# Patient Record
Sex: MALE | Race: BLACK OR AFRICAN AMERICAN | NOT HISPANIC OR LATINO | URBAN - METROPOLITAN AREA
[De-identification: names, ages, dates, MRNs, and addresses within clinical notes are randomized per-mention and may not be internally consistent; named-entity substitution may affect disease eponyms.]

---

## 2018-12-12 ENCOUNTER — HOSPITAL ENCOUNTER (OUTPATIENT)
Dept: OCCUPATIONAL THERAPY | Facility: HOSPITAL | Age: 33
End: 2018-12-13
Attending: INTERNAL MEDICINE | Admitting: INTERNAL MEDICINE

## 2018-12-12 LAB
ABS NEUT (OLG): 6.05 X10(3)/MCL (ref 2.1–9.2)
ALBUMIN SERPL-MCNC: 3.9 GM/DL (ref 3.4–5)
ALBUMIN/GLOB SERPL: 1 {RATIO}
ALP SERPL-CCNC: 66 UNIT/L (ref 45–117)
ALT SERPL-CCNC: 15 UNIT/L (ref 16–61)
AMPHET UR QL SCN: NEGATIVE
ANISOCYTOSIS BLD QL SMEAR: ABNORMAL
APPEARANCE, UA: CLEAR
AST SERPL-CCNC: 16 UNIT/L (ref 15–37)
BARBITURATE SCN PRESENT UR: NEGATIVE
BASOPHILS # BLD AUTO: 0.05 X10(3)/MCL (ref 0–0.2)
BASOPHILS NFR BLD AUTO: 0.5 % (ref 0–0.9)
BENZODIAZ UR QL SCN: NEGATIVE
BILIRUB SERPL-MCNC: 1.4 MG/DL (ref 0.2–1)
BILIRUB UR QL STRIP: NEGATIVE
BILIRUBIN DIRECT+TOT PNL SERPL-MCNC: 0.3 MG/DL (ref 0–0.2)
BILIRUBIN DIRECT+TOT PNL SERPL-MCNC: 1.1 MG/DL (ref 0–1)
BUN SERPL-MCNC: 7 MG/DL (ref 7–18)
CALCIUM SERPL-MCNC: 8.3 MG/DL (ref 8.5–10.1)
CANNABINOIDS UR QL SCN: NEGATIVE
CHLORIDE SERPL-SCNC: 105 MMOL/L (ref 98–107)
CO2 SERPL-SCNC: 26 MMOL/L (ref 21–32)
COCAINE UR QL SCN: NEGATIVE
COLOR UR: NORMAL
CREAT SERPL-MCNC: 0.6 MG/DL (ref 0.7–1.3)
EOSINOPHIL # BLD AUTO: 0.03 X10(3)/MCL (ref 0–0.9)
EOSINOPHIL NFR BLD AUTO: 0.3 % (ref 0–6.5)
ERYTHROCYTE [DISTWIDTH] IN BLOOD BY AUTOMATED COUNT: 15.1 % (ref 11.5–17)
GLOBULIN SER-MCNC: 4 GM/DL (ref 2–4)
GLUCOSE (UA): NEGATIVE
GLUCOSE SERPL-MCNC: 97 MG/DL (ref 74–106)
HCT VFR BLD AUTO: 30.8 % (ref 42–52)
HGB BLD-MCNC: 11.6 GM/DL (ref 14–18)
HGB UR QL STRIP: NEGATIVE
IMM GRANULOCYTES # BLD AUTO: 0.02 10*3/UL (ref 0–0.02)
IMM GRANULOCYTES NFR BLD AUTO: 0.2 % (ref 0–0.43)
KETONES UR QL STRIP: NEGATIVE
LEUKOCYTE ESTERASE UR QL STRIP: NEGATIVE
LYMPHOCYTES # BLD AUTO: 2.77 X10(3)/MCL (ref 0.6–4.6)
LYMPHOCYTES NFR BLD AUTO: 29.7 % (ref 16.2–38.3)
MCH RBC QN AUTO: 30.1 PG (ref 27–31)
MCHC RBC AUTO-ENTMCNC: 37.7 GM/DL (ref 33–36)
MCV RBC AUTO: 79.8 FL (ref 80–94)
METHADONE UR QL SCN: NEGATIVE
MONOCYTES # BLD AUTO: 0.4 X10(3)/MCL (ref 0.1–1.3)
MONOCYTES NFR BLD AUTO: 4.3 % (ref 4.7–11.3)
NEUTROPHILS # BLD AUTO: 6.05 X10(3)/MCL (ref 2.1–9.2)
NEUTROPHILS NFR BLD AUTO: 65 % (ref 49.1–73.4)
NITRITE UR QL STRIP: NEGATIVE
NRBC BLD AUTO-RTO: 0.5 % (ref 0–0.2)
OPIATES UR QL SCN: ABNORMAL
PCP UR QL: NEGATIVE
PH UR STRIP.AUTO: 6.5 [PH] (ref 5–7.5)
PH UR STRIP: 6.5 [PH] (ref 5–7)
PLATELET # BLD AUTO: 441 X10(3)/MCL (ref 130–400)
PLATELET # BLD EST: ABNORMAL 10*3/UL
PMV BLD AUTO: 9 FL (ref 7.4–10.4)
POIKILOCYTOSIS BLD QL SMEAR: ABNORMAL
POTASSIUM SERPL-SCNC: 3.2 MMOL/L (ref 3.5–5.1)
PROT SERPL-MCNC: 7.8 GM/DL (ref 6.4–8.2)
PROT UR QL STRIP: NEGATIVE
RBC # BLD AUTO: 3.86 X10(6)/MCL (ref 4.7–6.1)
RBC MORPH BLD: ABNORMAL
RET# (OHS): 0.16 X10^6/ML (ref 0.03–0.1)
RETICULOCYTE COUNT AUTOMATED (OLG): 4.2 % (ref 1.1–2.1)
SODIUM SERPL-SCNC: 141 MMOL/L (ref 136–145)
SP GR UR STRIP: 1.01 (ref 1–1.03)
TARGETS BLD QL SMEAR: ABNORMAL
UROBILINOGEN UR STRIP-ACNC: NEGATIVE
WBC # SPEC AUTO: 9.3 X10(3)/MCL (ref 4.5–11.5)

## 2018-12-13 LAB
ABS NEUT (OLG): 6.94 X10(3)/MCL (ref 2.1–9.2)
BASOPHILS # BLD AUTO: 0 X10(3)/MCL (ref 0–0.2)
BASOPHILS NFR BLD AUTO: 0 %
BUN SERPL-MCNC: 5 MG/DL (ref 7–18)
CALCIUM SERPL-MCNC: 8.5 MG/DL (ref 8.5–10.1)
CHLORIDE SERPL-SCNC: 103 MMOL/L (ref 98–107)
CO2 SERPL-SCNC: 27 MMOL/L (ref 21–32)
CREAT SERPL-MCNC: 0.58 MG/DL (ref 0.7–1.3)
CREAT/UREA NIT SERPL: 8.6
EOSINOPHIL # BLD AUTO: 0.1 X10(3)/MCL (ref 0–0.9)
EOSINOPHIL NFR BLD AUTO: 1 %
ERYTHROCYTE [DISTWIDTH] IN BLOOD BY AUTOMATED COUNT: 15.1 % (ref 11.5–17)
GLUCOSE SERPL-MCNC: 122 MG/DL (ref 74–106)
HCT VFR BLD AUTO: 28.9 % (ref 42–52)
HGB BLD-MCNC: 10.7 GM/DL (ref 14–18)
LYMPHOCYTES # BLD AUTO: 3.8 X10(3)/MCL (ref 0.6–4.6)
LYMPHOCYTES NFR BLD AUTO: 32 %
MAGNESIUM SERPL-MCNC: 1.9 MG/DL (ref 1.8–2.4)
MCH RBC QN AUTO: 30.6 PG (ref 27–31)
MCHC RBC AUTO-ENTMCNC: 37 GM/DL (ref 33–36)
MCV RBC AUTO: 82.6 FL (ref 80–94)
MONOCYTES # BLD AUTO: 0.8 X10(3)/MCL (ref 0.1–1.3)
MONOCYTES NFR BLD AUTO: 7 %
NEUTROPHILS # BLD AUTO: 6.94 X10(3)/MCL (ref 2.1–9.2)
NEUTROPHILS NFR BLD AUTO: 59 %
NRBC BLD AUTO-RTO: 0.3 % (ref 0–0.2)
PLATELET # BLD AUTO: 383 X10(3)/MCL (ref 130–400)
PMV BLD AUTO: 9.5 FL (ref 9.4–12.4)
POTASSIUM SERPL-SCNC: 3.8 MMOL/L (ref 3.5–5.1)
RBC # BLD AUTO: 3.5 X10(6)/MCL (ref 4.7–6.1)
SODIUM SERPL-SCNC: 139 MMOL/L (ref 136–145)
WBC # SPEC AUTO: 11.8 X10(3)/MCL (ref 4.5–11.5)

## 2019-12-31 ENCOUNTER — HISTORICAL (OUTPATIENT)
Dept: ADMINISTRATIVE | Facility: HOSPITAL | Age: 34
End: 2019-12-31

## 2019-12-31 LAB
CHOLEST SERPL-MCNC: 170 MG/DL (ref 0–200)
CHOLEST/HDLC SERPL: 2.8 {RATIO} (ref 0–5)
HDLC SERPL-MCNC: 60 MG/DL (ref 35–60)
LDLC SERPL CALC-MCNC: 97 MG/DL (ref 0–129)
TRIGL SERPL-MCNC: 66 MG/DL (ref 30–150)
VLDLC SERPL CALC-MCNC: 13 MG/DL

## 2020-01-06 ENCOUNTER — HISTORICAL (OUTPATIENT)
Dept: ADMINISTRATIVE | Facility: HOSPITAL | Age: 35
End: 2020-01-06

## 2020-02-05 ENCOUNTER — HISTORICAL (OUTPATIENT)
Dept: INTENSIVE CARE | Facility: HOSPITAL | Age: 35
End: 2020-02-05

## 2020-07-31 ENCOUNTER — HISTORICAL (OUTPATIENT)
Dept: ADMINISTRATIVE | Facility: HOSPITAL | Age: 35
End: 2020-07-31

## 2020-07-31 LAB
ABS NEUT (OLG): 6.99 X10(3)/MCL (ref 2.1–9.2)
ALBUMIN SERPL-MCNC: 4.1 GM/DL (ref 3.5–5)
ALBUMIN/GLOB SERPL: 1.4 RATIO (ref 1.1–2)
ALP SERPL-CCNC: 82 UNIT/L (ref 40–150)
ALT SERPL-CCNC: 19 UNIT/L (ref 0–55)
AST SERPL-CCNC: 19 UNIT/L (ref 5–34)
BASOPHILS # BLD AUTO: 0 X10(3)/MCL (ref 0–0.2)
BASOPHILS NFR BLD AUTO: 0 %
BILIRUB SERPL-MCNC: 1.2 MG/DL
BILIRUBIN DIRECT+TOT PNL SERPL-MCNC: 0.4 MG/DL (ref 0–0.5)
BILIRUBIN DIRECT+TOT PNL SERPL-MCNC: 0.8 MG/DL (ref 0–0.8)
BUN SERPL-MCNC: 12.9 MG/DL (ref 8.9–20.6)
CALCIUM SERPL-MCNC: 8.9 MG/DL (ref 8.4–10.2)
CHLORIDE SERPL-SCNC: 104 MMOL/L (ref 98–107)
CHOLEST SERPL-MCNC: 192 MG/DL
CHOLEST/HDLC SERPL: 4 {RATIO} (ref 0–5)
CO2 SERPL-SCNC: 29 MMOL/L (ref 22–29)
CREAT SERPL-MCNC: 0.84 MG/DL (ref 0.73–1.18)
EOSINOPHIL # BLD AUTO: 0.1 X10(3)/MCL (ref 0–0.9)
EOSINOPHIL NFR BLD AUTO: 1 %
ERYTHROCYTE [DISTWIDTH] IN BLOOD BY AUTOMATED COUNT: 16.6 % (ref 11.5–17)
GLOBULIN SER-MCNC: 3 GM/DL (ref 2.4–3.5)
GLUCOSE SERPL-MCNC: 95 MG/DL (ref 74–100)
HCT VFR BLD AUTO: 33.8 % (ref 42–52)
HDLC SERPL-MCNC: 45 MG/DL (ref 35–60)
HGB BLD-MCNC: 11.8 GM/DL (ref 14–18)
LDLC SERPL CALC-MCNC: 129 MG/DL (ref 50–140)
LYMPHOCYTES # BLD AUTO: 1.9 X10(3)/MCL (ref 0.6–4.6)
LYMPHOCYTES NFR BLD AUTO: 19 %
MAGNESIUM SERPL-MCNC: 2.03 MG/DL (ref 1.6–2.6)
MCH RBC QN AUTO: 28.4 PG (ref 27–31)
MCHC RBC AUTO-ENTMCNC: 34.9 GM/DL (ref 33–36)
MCV RBC AUTO: 81.4 FL (ref 80–94)
MONOCYTES # BLD AUTO: 0.6 X10(3)/MCL (ref 0.1–1.3)
MONOCYTES NFR BLD AUTO: 7 %
NEUTROPHILS # BLD AUTO: 6.99 X10(3)/MCL (ref 2.1–9.2)
NEUTROPHILS NFR BLD AUTO: 72 %
PHOSPHATE SERPL-MCNC: 3.3 MG/DL (ref 2.3–4.7)
PLATELET # BLD AUTO: 314 X10(3)/MCL (ref 130–400)
PMV BLD AUTO: 9 FL (ref 9.4–12.4)
POTASSIUM SERPL-SCNC: 3.8 MMOL/L (ref 3.5–5.1)
PROT SERPL-MCNC: 7.1 GM/DL (ref 6.4–8.3)
RBC # BLD AUTO: 4.15 X10(6)/MCL (ref 4.7–6.1)
SODIUM SERPL-SCNC: 138 MMOL/L (ref 136–145)
TRIGL SERPL-MCNC: 92 MG/DL (ref 34–140)
VLDLC SERPL CALC-MCNC: 18 MG/DL
WBC # SPEC AUTO: 9.7 X10(3)/MCL (ref 4.5–11.5)

## 2021-07-24 ENCOUNTER — HISTORICAL (OUTPATIENT)
Dept: HEPATOLOGY | Facility: HOSPITAL | Age: 36
End: 2021-07-24

## 2021-07-24 LAB — POC CREATININE: 0.8 MG/DL (ref 0.6–1.3)

## 2021-09-13 ENCOUNTER — HISTORICAL (OUTPATIENT)
Dept: ADMINISTRATIVE | Facility: HOSPITAL | Age: 36
End: 2021-09-13

## 2021-09-13 LAB
ABS NEUT (OLG): 3.41 X10(3)/MCL (ref 2.1–9.2)
ALBUMIN SERPL-MCNC: 4.3 GM/DL (ref 3.5–5)
ALBUMIN/GLOB SERPL: 1.3 RATIO (ref 1.1–2)
ALP SERPL-CCNC: 72 UNIT/L (ref 40–150)
ALT SERPL-CCNC: 17 UNIT/L (ref 0–55)
AST SERPL-CCNC: 21 UNIT/L (ref 5–34)
BASOPHILS # BLD AUTO: 0 X10(3)/MCL (ref 0–0.2)
BASOPHILS NFR BLD AUTO: 1 %
BILIRUB SERPL-MCNC: 1.2 MG/DL
BILIRUBIN DIRECT+TOT PNL SERPL-MCNC: 0.5 MG/DL (ref 0–0.5)
BILIRUBIN DIRECT+TOT PNL SERPL-MCNC: 0.7 MG/DL (ref 0–0.8)
BUN SERPL-MCNC: 7.8 MG/DL (ref 8.9–20.6)
CALCIUM SERPL-MCNC: 9.4 MG/DL (ref 8.4–10.2)
CHLORIDE SERPL-SCNC: 104 MMOL/L (ref 98–107)
CHOLEST SERPL-MCNC: 180 MG/DL
CHOLEST/HDLC SERPL: 3 {RATIO} (ref 0–5)
CO2 SERPL-SCNC: 27 MMOL/L (ref 22–29)
CREAT SERPL-MCNC: 0.76 MG/DL (ref 0.73–1.18)
DEPRECATED CALCIDIOL+CALCIFEROL SERPL-MC: 5.7 NG/ML (ref 30–80)
EOSINOPHIL # BLD AUTO: 0.1 X10(3)/MCL (ref 0–0.9)
EOSINOPHIL NFR BLD AUTO: 1 %
ERYTHROCYTE [DISTWIDTH] IN BLOOD BY AUTOMATED COUNT: 15.5 % (ref 11.5–17)
EST. AVERAGE GLUCOSE BLD GHB EST-MCNC: <68.1 MG/DL
GLOBULIN SER-MCNC: 3.2 GM/DL (ref 2.4–3.5)
GLUCOSE SERPL-MCNC: 85 MG/DL (ref 74–100)
HBA1C MFR BLD: <4 %
HCT VFR BLD AUTO: 33 % (ref 42–52)
HDLC SERPL-MCNC: 54 MG/DL (ref 35–60)
HGB BLD-MCNC: 11.8 GM/DL (ref 14–18)
LDLC SERPL CALC-MCNC: 115 MG/DL (ref 50–140)
LYMPHOCYTES # BLD AUTO: 1.6 X10(3)/MCL (ref 0.6–4.6)
LYMPHOCYTES NFR BLD AUTO: 29 %
MCH RBC QN AUTO: 30.5 PG (ref 27–31)
MCHC RBC AUTO-ENTMCNC: 35.8 GM/DL (ref 33–36)
MCV RBC AUTO: 85.3 FL (ref 80–94)
MONOCYTES # BLD AUTO: 0.5 X10(3)/MCL (ref 0.1–1.3)
MONOCYTES NFR BLD AUTO: 9 %
NEUTROPHILS # BLD AUTO: 3.41 X10(3)/MCL (ref 2.1–9.2)
NEUTROPHILS NFR BLD AUTO: 60 %
PLATELET # BLD AUTO: 513 X10(3)/MCL (ref 130–400)
PMV BLD AUTO: 9.5 FL (ref 9.4–12.4)
POTASSIUM SERPL-SCNC: 4.5 MMOL/L (ref 3.5–5.1)
PROT SERPL-MCNC: 7.5 GM/DL (ref 6.4–8.3)
RBC # BLD AUTO: 3.87 X10(6)/MCL (ref 4.7–6.1)
SODIUM SERPL-SCNC: 139 MMOL/L (ref 136–145)
TRIGL SERPL-MCNC: 54 MG/DL (ref 34–140)
VLDLC SERPL CALC-MCNC: 11 MG/DL
WBC # SPEC AUTO: 5.7 X10(3)/MCL (ref 4.5–11.5)

## 2022-04-30 NOTE — ED PROVIDER NOTES
"   Patient:   Darrell Herrera II             MRN: [id number]            FIN: [id number]               Age:   33 years     Sex:  Male     :  1985   Associated Diagnoses:   Sickle cell pain crisis; Sickle cell pain; Intractable pain   Author:   Suzan Nguyen MD      Basic Information   Additional information: Chief Complaint from Nursing Triage Note : Chief Complaint   2018 12:32 CST     Chief Complaint           c/o bilat hip and leg pain due to sickle cell. onset last pm. denies any recent injuries. no  since 2018-[facility] oncology  .      History of Present Illness   The patient presents with sickle cell pain.  The course/duration of symptoms is constant.  Location: bilateral hip pain. The character of symptoms is sharp.  The exacerbating factor is movement.  The relieving factor is none.  Prior episodes: similar to "sickle cell pain" and occasional.  Therapy today: none.  Associated symptoms: denies chest pain, denies abdominal pain, denies nausea, denies vomiting, denies shortness of breath, denies fever, denies chills, denies headache, denies dizziness and denies fatigue.  Additional history: He hasn't see a hematologist since September. Says his doctor referred him to [facility] but then was told by [facility] they don't see sickle patients.  He has not been on his hydroxyurea, folic acid, or pain medications since then. .  He used to be on morphine extended release but does not appear to have been on it since October. He usually is managed at [facility] but states that he not longer sees that physician and has not follow with a new one. Hx of right  hip avascular necrosis.        Review of Systems   Constitutional symptoms:  Negative except as documented in HPI.   Skin symptoms:  Negative except as documented in HPI.   Eye symptoms:  Negative except as documented in HPI.   ENMT symptoms:  Negative except as documented in HPI.   Respiratory symptoms:  Negative except as documented in HPI.   Cardiovascular " symptoms:  Negative except as documented in HPI.   Gastrointestinal symptoms:  Negative except as documented in HPI.   Genitourinary symptoms:  Negative except as documented in HPI.   Musculoskeletal symptoms:  Negative except as documented in HPI.   Neurologic symptoms:  Negative except as documented in HPI.             Additional review of systems information: All other systems reviewed and otherwise negative.      Health Status   Allergies:    Allergic Reactions (Selected)  Severity Not Documented  Bactrim- Hives.,    Allergies (1) Active Reaction  Bactrim HIVES.   Medications:  (Selected)   Inpatient Medications  Ordered  Norco 10 mg-325 mg oral tablet: 1 tab(s), form: Tab, Oral, Once, first dose 11/27/18 12:46:00 CST, stop date 11/27/18 12:46:00 CST, STAT  Prescriptions  Prescribed  CeleBREX 200 mg oral capsule: 200 mg = 1 cap(s), Oral, BID, PRN PRN as needed for pain, # 28 cap(s), 0 Refill(s)  Documented Medications  Documented  HYDROCO/APAP TAB 7.5-325:   MORPHINE SUL 30MG IMM REL TAB: 30 mg = 1 tab(s), Oral, q8hr  MORPHINE SUL TAB 100MG ER: 100 mg = 1 tab(s), Oral, BID  MORPHINE SUL TAB 200MG ER: 200 mg = 1 tab(s), Oral, BID.      Past Medical/ Family/ Social History   Medical history:    No active or resolved past medical history items have been selected or recorded..   Surgical history:    HERNEA REPAIR.  LAP ADY.  MEDI PORT..   Family history:    No family history items have been selected or recorded..   Social history:    Social & Psychosocial Habits    Alcohol  11/27/2018  Use: Never    Substance Abuse  11/27/2018  Use: Never    Tobacco  12/12/2018  Use: Never smoker    Patient Wants Consult For Cessation Counseling N/A.   Problem list:    Active Problems (2)  Avascular necrosis   Sickle cell .      Physical Examination               Vital Signs             Time:  11/23/2015 07:10:00.      Vital Signs (last 24 hrs)_____  Last Charted___________  Temp Oral     36.5 DegC  (DEC 12 12:32)  Heart Rate  Peripheral   84 bpm  (DEC 12 12:32)  Resp Rate         18 br/min  (DEC 12 12:32)  SBP      H 163mmHg  (DEC 12 12:32)  DBP      90 mmHg  (DEC 12 12:32)  SpO2      98 %  (DEC 12 12:32).   Vital Signs   12/12/2018 12:32 CST     Temperature Oral          36.5 DegC                             Temperature Oral (calculated)             97.70 DegF                             Peripheral Pulse Rate     84 bpm                             Respiratory Rate          18 br/min                             SpO2                      98 %                             Oxygen Therapy            Room air                             Systolic Blood Pressure   163 mmHg  HI                             Diastolic Blood Pressure  90 mmHg  .   General:  Alert, no acute distress, Non toxic appearing.    Skin:  Warm, dry.    Head:  Normocephalic, atraumatic.    Neck:  Trachea midline.   Eye:  Normal conjunctiva.   Ears, nose, mouth and throat:  Oral mucosa moist.   Cardiovascular:  Regular rate and rhythm, No murmur, No edema.    Respiratory:  Lungs are clear to auscultation, respirations are non-labored, breath sounds are equal.    Gastrointestinal:  Soft, Nontender, Non distended, Normal bowel sounds.    Musculoskeletal:  No tenderness, no swelling.    Neurological:  Alert and oriented to person, place, time, and situation, normal speech observed.    Psychiatric:  Cooperative, appropriate mood & affect.       Medical Decision Making   Rationale:  11/27/2018 1 11/27/2018 HYDROCODONE-ACETAMIN 7.5-325 24.0 7 JU JENNIFER [id number] HEALT (3150) 0 25.71 [place] 10/12/2018 2 10/09/2018 MORPHINE SULF  MG TABLET 60.0 30 EL BAL [id number] WALGR (5562) 0 400.0 [place] 09/27/2018 2 09/27/2018 MORPHINE SULFATE IR 30 MG TAB 75.0 25 EL BAL [id number] WALGR (5562) 0 90.0 [place]   , BUTCH, SURESH H [address] [address] [phone]   MD MIGUEL ANGEL, MELODY BONE [address] [place] [address] [phone]    MD TOSIN, SHANKAR B [address] [facility] [address] [phone]   LISA PINA [address] [facility] [address] [phone]   [place], HEAVENLYRAYMUNDO [address] [facility] [address] [phone]   KRISTOFER SERRANO JR, CHARLES [address] [facility] [address] [facility] [address] [phone]   .    Orders  Launch Order Profile (Selected)   Inpatient Orders  InProcess (In Process)  Drug Screen Urine [facility]: Stat collect, Urine, 12/12/18 12:38:00 CST, Stop date 12/12/18 12:39:00 CST, Nurse collect  Ordered  Dilaudid 1 mg/mL injectable solution: 1 mg, form: Injection, IV, Once, first dose 12/12/18 13:29:00 CST, stop date 12/12/18 13:29:00 CST, STAT  Normal Saline (0.9% NS) IV: 1,000 mL, 1,000 mL, IV, 1000 mL/hr, start date 12/12/18 12:39:00 CST, stop date 12/12/18 12:39:00 CST, STAT  Ordered (Dispatched)  CBC w/ Auto Diff: Now collect, 12/12/18 12:38:00 CST, Blood, Stop date 12/12/18 12:39:00 CST, Lab Collect, 12/12/18 12:38:00 CST  CMP: Stat collect, 12/12/18 12:38:00 CST, Blood, Stop date 12/12/18 12:39:00 CST, Lab Collect, 12/12/18 12:38:00 CST  Reticulocyte Count: Stat collect, 12/12/18 12:38:00 CST, Blood, Stop date 12/12/18 12:39:00 CST, Lab Collect, 12/12/18 12:38:00 CST  Completed  Urinalysis with Microscopic if Indicated: Stat collect, Urine, 12/12/18 12:38:00 CST, Stop date 12/12/18 12:39:00 CST, Nurse collect.      Reexamination/ Reevaluation   Time: 12/12/2018 15:37:00 .   Pain status: unchanged.   Notes: Labs don't look terrible but patient still shaking in the bed with pain despite 2mg dilaudid and toradol. He also has no follow up scheduled and needs to be started back on maintenence medications. There does not appear to be a sickle cells clinic, or hematologist that could easily be referred to for follow up with. This in consideration with out inability to control his pain, patient opts for admission for prolonged hydration, pain management, initiation to get back on his medications,  and appropriate set up with physician/hematologist to take over care of his sickle cell disease. .      Impression and Plan   Diagnosis   Sickle cell pain crisis (RBZ74-JS D57.00)   Sickle cell pain (PNED [id number])   Intractable pain (OUR57-PB R52)      Calls-Consults   -  12/12/2018 15:40:00 , hospitalist.    Plan   Counseled: Patient.

## 2022-04-30 NOTE — DISCHARGE SUMMARY
Patient:   Darrell Herrera II             MRN: [id number]            FIN: [id number]               Age:   33 years     Sex:  Male     :  1985   Associated Diagnoses:   None   Author:   Giacomo CH, Flaquita Hong      Discharge Information      Discharge Summary Information   Admit/Discharge Dates   Admit Date: 2018  Discharge Date: 2018   Physicians   Attending Physician - Vinh CH, Jayden KENNEY  Admitting Physician - Jayden Justice MD  Consulting Physician - Luís CH, Cholo BOLES  Consulting Physician - Vinh CH, Jayden KENNEY  Primary Care Physician - No PCP, No   Discharge Diagnosis   Hb-SS disease with crisis, unspecified (D57.00)   Pain, unspecified (R52)    Procedures   No procedures recorded for this visit.   Immunizations   2018 - influenza virus vaccine, inactivated    Discharge Medications   Prescribed  acetaminophen-HYDROcodone (Norco 7.5 mg-325 mg oral tablet) 1 tab(s), Oral, q4hr, PRN for pain  Continue  celecoxib (CeleBREX 200 mg oral capsule) 200 mg, Oral, BID, PRN as needed for pain  folic acid (folic acid 1 mg oral tablet) 1 mg, Oral, Daily  hydroxyurea (hydroxyurea 500 mg oral capsule) 1 cap(s), Oral, TID  Discontinue  acetaminophen-HYDROcodone (HYDROCO/APAP TAB 7.5-325)  morphine (MORPHINE SUL 30MG IMM REL TAB) 30 mg, Oral, q8hr  morphine (MORPHINE SUL TAB 100MG ER) 100 mg, Oral, BID  morphine (MORPHINE SUL TAB 200MG ER) 200 mg, Oral, BID      Education   Sickle Cell Anemia, Adult      Hospital Course   Patient is a 33-year-old -American male who was transferred from  also with complaint of bilateral lower extremity pain.  Patient's history includes sickle cell disease.  Patient was started on IV fluids and as needed narcotic pain medications.  His pain was mainly in the bilateral lower extremities with no complaints of chest pain.  No complaints of shortness of breath.  No fevers, urinary symptoms or any other signs of infection.  Patient was admitted to  hospitalist service and maintained on IV fluids and analgesics.  This morning patient feeling much better and is ready for discharge.  He states his pain is well controlled.  He will need for prescription for Norco and case management was consulted to set him up with a PCP.  Overall his other medical comorbidities have remained stable.  Time spent: 33 minutes           Physical Examination   General:  Alert and oriented, No acute distress.    Neck:  Supple, Non-tender, No carotid bruit.    Respiratory:  Lungs are clear to auscultation, Respirations are non-labored, Breath sounds are equal.    Cardiovascular:  Normal rate, Regular rhythm, No murmur.    Gastrointestinal:  Soft, Non-tender, Non-distended, Normal bowel sounds.    Musculoskeletal:  Normal range of motion, Normal strength, No tenderness, No swelling.    Integumentary:  Warm, Dry, Intact.    Neurologic:  Alert, Oriented, Normal sensory, No focal deficits.           Discharge Plan   Discharge Summary Plan   Discharge disposition: discharge to home (into the care of family member, self care).     Prescriptions: continue same medications, reviewed with patient, written and given to patient, Per med rec sheet.     Orders     Orders   Admit/Transfer/Discharge:  Discharge (Order): 12/13/2018 9:29 CST, Home  :  Consult Case Management (Order): 12/13/2018 9:29 CST, Set up with PCP please.     Education and Follow-up   Counseled: patient, regarding diagnosis, regarding treatment, regarding medications.     Discharge Planning: Sickle Cell Anemia, Adult, Micki Mckeon 12/27/2018 02:15:00; .

## 2023-11-28 ENCOUNTER — HOSPITAL ENCOUNTER (EMERGENCY)
Facility: HOSPITAL | Age: 38
Discharge: HOME | End: 2023-11-28
Attending: INTERNAL MEDICINE
Payer: MEDICARE

## 2023-11-28 VITALS
SYSTOLIC BLOOD PRESSURE: 140 MMHG | OXYGEN SATURATION: 96 % | HEIGHT: 69 IN | BODY MASS INDEX: 25.33 KG/M2 | TEMPERATURE: 100 F | RESPIRATION RATE: 18 BRPM | WEIGHT: 171 LBS | HEART RATE: 103 BPM | DIASTOLIC BLOOD PRESSURE: 107 MMHG

## 2023-11-28 DIAGNOSIS — G89.4 CHRONIC PAIN SYNDROME: Primary | ICD-10-CM

## 2023-11-28 LAB
ALBUMIN SERPL-MCNC: 4.2 G/DL (ref 3.5–5)
ALBUMIN/GLOB SERPL: 1.1 RATIO (ref 1.1–2)
ALP SERPL-CCNC: 115 UNIT/L (ref 40–150)
ALT SERPL-CCNC: 95 UNIT/L (ref 0–55)
ANISOCYTOSIS BLD QL SMEAR: ABNORMAL
AST SERPL-CCNC: 63 UNIT/L (ref 5–34)
BASOPHILS # BLD AUTO: 0.11 X10(3)/MCL
BASOPHILS NFR BLD AUTO: 0.9 %
BILIRUB SERPL-MCNC: 1.5 MG/DL
BUN SERPL-MCNC: 7.2 MG/DL (ref 8.9–20.6)
CALCIUM SERPL-MCNC: 9.6 MG/DL (ref 8.4–10.2)
CHLORIDE SERPL-SCNC: 100 MMOL/L (ref 98–107)
CO2 SERPL-SCNC: 27 MMOL/L (ref 22–29)
CREAT SERPL-MCNC: 0.8 MG/DL (ref 0.73–1.18)
ELLIPTOCYTOSIS (OHS): SLIGHT
EOSINOPHIL # BLD AUTO: 0.21 X10(3)/MCL (ref 0–0.9)
EOSINOPHIL NFR BLD AUTO: 1.8 %
ERYTHROCYTE [DISTWIDTH] IN BLOOD BY AUTOMATED COUNT: 16.2 % (ref 11.5–17)
GFR SERPLBLD CREATININE-BSD FMLA CKD-EPI: >60 MLS/MIN/1.73/M2
GLOBULIN SER-MCNC: 4 GM/DL (ref 2.4–3.5)
GLUCOSE SERPL-MCNC: 110 MG/DL (ref 74–100)
HCT VFR BLD AUTO: 35 % (ref 42–52)
HGB BLD-MCNC: 13.1 G/DL (ref 14–18)
HYPOCHROMIA BLD QL SMEAR: SLIGHT
IMM GRANULOCYTES # BLD AUTO: 0.03 X10(3)/MCL (ref 0–0.04)
IMM GRANULOCYTES NFR BLD AUTO: 0.3 %
LYMPHOCYTES # BLD AUTO: 2.85 X10(3)/MCL (ref 0.6–4.6)
LYMPHOCYTES NFR BLD AUTO: 24.5 %
MACROCYTES BLD QL SMEAR: ABNORMAL
MAGNESIUM SERPL-MCNC: 2 MG/DL (ref 1.6–2.6)
MCH RBC QN AUTO: 30.5 PG (ref 27–31)
MCHC RBC AUTO-ENTMCNC: 37.4 G/DL (ref 33–36)
MCV RBC AUTO: 81.6 FL (ref 80–94)
MICROCYTES BLD QL SMEAR: ABNORMAL
MONOCYTES # BLD AUTO: 0.66 X10(3)/MCL (ref 0.1–1.3)
MONOCYTES NFR BLD AUTO: 5.7 %
NEUTROPHILS # BLD AUTO: 7.79 X10(3)/MCL (ref 2.1–9.2)
NEUTROPHILS NFR BLD AUTO: 66.8 %
NRBC BLD AUTO-RTO: 0.3 %
OVALOCYTES (OLG): SLIGHT
PLATELET # BLD AUTO: 304 X10(3)/MCL (ref 130–400)
PLATELET # BLD EST: ADEQUATE 10*3/UL
PMV BLD AUTO: 9.4 FL (ref 7.4–10.4)
POIKILOCYTOSIS BLD QL SMEAR: ABNORMAL
POLYCHROMASIA BLD QL SMEAR: SLIGHT
POTASSIUM SERPL-SCNC: 3.4 MMOL/L (ref 3.5–5.1)
PROT SERPL-MCNC: 8.2 GM/DL (ref 6.4–8.3)
RBC # BLD AUTO: 4.29 X10(6)/MCL (ref 4.7–6.1)
RBC MORPH BLD: ABNORMAL
RET# (OHS): 0.19 X10E6/UL (ref 0.03–0.1)
RETICULOCYTE COUNT AUTOMATED (OLG): 4.53 % (ref 1.1–2.1)
SODIUM SERPL-SCNC: 140 MMOL/L (ref 136–145)
STIPPLED RBC (OHS): SLIGHT
TEAR DROP CELL (OLG): ABNORMAL
TROPONIN I SERPL-MCNC: <0.01 NG/ML (ref 0–0.04)
WBC # SPEC AUTO: 11.65 X10(3)/MCL (ref 4.5–11.5)

## 2023-11-28 PROCEDURE — 63600175 PHARM REV CODE 636 W HCPCS: Performed by: INTERNAL MEDICINE

## 2023-11-28 PROCEDURE — 99284 EMERGENCY DEPT VISIT MOD MDM: CPT

## 2023-11-28 PROCEDURE — 96372 THER/PROPH/DIAG INJ SC/IM: CPT | Performed by: INTERNAL MEDICINE

## 2023-11-28 PROCEDURE — 84484 ASSAY OF TROPONIN QUANT: CPT | Performed by: INTERNAL MEDICINE

## 2023-11-28 PROCEDURE — 80053 COMPREHEN METABOLIC PANEL: CPT | Performed by: INTERNAL MEDICINE

## 2023-11-28 PROCEDURE — 85045 AUTOMATED RETICULOCYTE COUNT: CPT | Performed by: INTERNAL MEDICINE

## 2023-11-28 PROCEDURE — 85025 COMPLETE CBC W/AUTO DIFF WBC: CPT | Performed by: INTERNAL MEDICINE

## 2023-11-28 PROCEDURE — 83735 ASSAY OF MAGNESIUM: CPT | Performed by: INTERNAL MEDICINE

## 2023-11-28 RX ORDER — DIPHENHYDRAMINE HYDROCHLORIDE 50 MG/ML
50 INJECTION INTRAMUSCULAR; INTRAVENOUS ONCE
Status: COMPLETED | OUTPATIENT
Start: 2023-11-28 | End: 2023-11-28

## 2023-11-28 RX ORDER — HYDROMORPHONE HYDROCHLORIDE 2 MG/ML
0.5 INJECTION, SOLUTION INTRAMUSCULAR; INTRAVENOUS; SUBCUTANEOUS ONCE
Status: COMPLETED | OUTPATIENT
Start: 2023-11-28 | End: 2023-11-28

## 2023-11-28 RX ORDER — DIPHENHYDRAMINE HYDROCHLORIDE 50 MG/ML
INJECTION INTRAMUSCULAR; INTRAVENOUS
Status: DISCONTINUED
Start: 2023-11-28 | End: 2023-11-28 | Stop reason: HOSPADM

## 2023-11-28 RX ORDER — HYDROMORPHONE HYDROCHLORIDE 2 MG/ML
INJECTION, SOLUTION INTRAMUSCULAR; INTRAVENOUS; SUBCUTANEOUS
Status: DISCONTINUED
Start: 2023-11-28 | End: 2023-11-28 | Stop reason: HOSPADM

## 2023-11-28 RX ADMIN — HYDROMORPHONE HYDROCHLORIDE 0.5 MG: 2 INJECTION INTRAMUSCULAR; INTRAVENOUS; SUBCUTANEOUS at 09:11

## 2023-11-28 RX ADMIN — DIPHENHYDRAMINE HYDROCHLORIDE 50 MG: 50 INJECTION INTRAMUSCULAR; INTRAVENOUS at 09:11

## 2023-11-29 NOTE — ED PROVIDER NOTES
Source of History:  Patient, no limitations    Chief complaint:  Leg Pain (Pt complaint of bilateral leg pain x 3 days relating that he has sickle cell disease under care of physician [place] at [facility] and PCP Dr Valdez in [place])      HPI:  Darrell Herrera III is a 38 y.o. male presenting with Leg Pain (Pt complaint of bilateral leg pain x 3 days relating that he has sickle cell disease under care of physician in [place] at [facility] and PCP Dr Valdez in [place])       Reported sickle cell disease, no recent transfusion nor exchange, reports compliant to Rx. Currently on percocet 1 time per day. Complains of BL leg pain, similar to previous episodes.   Sickle Cell Pain Crisis   This is a chronic problem. The problem occurs continuously. The pain is associated with cold exposure. The pain location is generalized. Site of pain is localized in large and small joints. The pain is Similar to prior episodes. Nothing relieves the symptoms. The symptoms are not relieved by one or more prescription drugs. Pertinent negatives include no chest pain, no nausea, no dysuria, no sore throat, no back pain, no weakness and no rash.        Review of Systems   Constitutional symptoms:  Negative except as documented in HPI.   Skin symptoms:  Negative except as documented in HPI.   HEENT symptoms:  Negative except as documented in HPI.   Respiratory symptoms:  Negative except as documented in HPI.   Cardiovascular symptoms:  Negative except as documented in HPI.   Gastrointestinal symptoms:  Negative except as documented in HPI.    Genitourinary symptoms:  Negative except as documented in HPI.   Musculoskeletal symptoms:  Negative except as documented in HPI.   Neurologic symptoms:  Negative except as documented in HPI.   Psychiatric symptoms:  Negative except as documented in HPI.   Allergy/immunologic symptoms:  Negative except as documented in HPI.          "    Additional review of systems information: All other systems reviewed and otherwise negative.      Review of patient's allergies indicates:   Allergen Reactions    Bactrim [sulfamethoxazole-trimethoprim]        PMH:  As per HPI and below:    No past medical history on file.     No family history on file.    No past surgical history on file.         There is no problem list on file for this patient.       Physical Exam:    BP (!) 140/107   Pulse 103   Temp 99.6 °F (37.6 °C) (Oral)   Resp 18   Ht 5' 9" (1.753 m)   Wt 77.6 kg (171 lb)   SpO2 96%   BMI 25.25 kg/m²     Nursing note and vital signs reviewed.    General:  Alert, no acute distress.   Skin: Normal for Ethnic Origin, No cyanosis  HEENT: Normocephalic and atraumatic, Vision unchanged, Pupils symmetric, No icterus , Nasal mucosa is pink and moist  Cardiovascular:  Regular rate and rhythm, No edema  Chest Wall: No deformity, equal chest rise  Respiratory:  Lungs are clear to auscultation, respirations are non-labored.    Musculoskeletal:  No deformity, Normal perfusion to all extremities  Gastrointestinal:  Soft, Non distended  Neurological:  Alert and oriented, normal motor observed, normal speech observed.    Psychiatric:  Cooperative, appropriate mood & affect.        Labs that have been ordered have been independently reviewed and interpreted by myself.     Old Chart Reviewed.      Initial Impression/ Differential Dx:  Vaso-occlusive crisis, acute chest syndrome/pneumonia, perthes disease, septic arthritis, avascular necrosis, dehydration, hemolytic anemia, chronic pain, opiate dependence      MDM:      Reviewed Nurses Note.    Reviewed Pertinent old records.    Orders Placed This Encounter    CBC Auto Differential    Comprehensive Metabolic Panel    Reticulocytes    Magnesium    Troponin I    CBC with Differential    Blood Smear Microscopic Exam    diphenhydrAMINE injection 50 mg    HYDROmorphone (PF) injection 0.5 mg    HYDROmorphone (PF) " (DILAUDID) 2 mg/mL injection    diphenhydrAMINE (BENADRYL) 50 mg/mL injection                    Labs Reviewed   COMPREHENSIVE METABOLIC PANEL - Abnormal; Notable for the following components:       Result Value    Potassium Level 3.4 (*)     Glucose Level 110 (*)     Blood Urea Nitrogen 7.2 (*)     Globulin 4.0 (*)     Alanine Aminotransferase 95 (*)     Aspartate Aminotransferase 63 (*)     All other components within normal limits   RETICULOCYTES - Abnormal; Notable for the following components:    Retic Cnt Auto 4.53 (*)     RET# 0.1943 (*)     All other components within normal limits   CBC WITH DIFFERENTIAL - Abnormal; Notable for the following components:    WBC 11.65 (*)     RBC 4.29 (*)     Hgb 13.1 (*)     Hct 35.0 (*)     MCHC 37.4 (*)     All other components within normal limits   BLOOD SMEAR MICROSCOPIC EXAM (OLG) - Abnormal; Notable for the following components:    RBC Morph Abnormal (*)     Anisocytosis 1+ (*)     Elliptocytosis Slight (*)     Hypochromasia Slight (*)     Macrocytosis 1+ (*)     Microcytosis 1+ (*)     Ovalocytes Slight (*)     Poikilocytosis 1+ (*)     Polychromasia Slight (*)     Stippled RBCs Slight (*)     Tear Drops 1+ (*)     All other components within normal limits   MAGNESIUM - Normal   TROPONIN I - Normal   CBC W/ AUTO DIFFERENTIAL    Narrative:     The following orders were created for panel order CBC Auto Differential.  Procedure                               Abnormality         Status                     ---------                               -----------         ------                     CBC with Differential[[id number]]       Abnormal            Final result                 Please view results for these tests on the individual orders.          No orders to display        Admission on 11/28/2023, Discharged on 11/28/2023   Component Date Value Ref Range Status    Sodium Level 11/28/2023 140  136 - 145 mmol/L Final    Potassium Level 11/28/2023 3.4 (L)  3.5 - 5.1 mmol/L  Final    Chloride 11/28/2023 100  98 - 107 mmol/L Final    Carbon Dioxide 11/28/2023 27  22 - 29 mmol/L Final    Glucose Level 11/28/2023 110 (H)  74 - 100 mg/dL Final    Blood Urea Nitrogen 11/28/2023 7.2 (L)  8.9 - 20.6 mg/dL Final    Creatinine 11/28/2023 0.80  0.73 - 1.18 mg/dL Final    Calcium Level Total 11/28/2023 9.6  8.4 - 10.2 mg/dL Final    Protein Total 11/28/2023 8.2  6.4 - 8.3 gm/dL Final    Albumin Level 11/28/2023 4.2  3.5 - 5.0 g/dL Final    Globulin 11/28/2023 4.0 (H)  2.4 - 3.5 gm/dL Final    Albumin/Globulin Ratio 11/28/2023 1.1  1.1 - 2.0 ratio Final    Bilirubin Total 11/28/2023 1.5  <=1.5 mg/dL Final    Alkaline Phosphatase 11/28/2023 115  40 - 150 unit/L Final    Alanine Aminotransferase 11/28/2023 95 (H)  0 - 55 unit/L Final    Aspartate Aminotransferase 11/28/2023 63 (H)  5 - 34 unit/L Final    eGFR 11/28/2023 >60  mls/min/1.73/m2 Final    Retic Cnt Auto 11/28/2023 4.53 (H)  1.1 - 2.1 % Final    RET# 11/28/2023 0.1943 (H)  0.026 - 0.095 x10e6/uL Final    Magnesium Level 11/28/2023 2.00  1.60 - 2.60 mg/dL Final    Troponin-I 11/28/2023 <0.010  0.000 - 0.045 ng/mL Final    WBC 11/28/2023 11.65 (H)  4.50 - 11.50 x10(3)/mcL Final    RBC 11/28/2023 4.29 (L)  4.70 - 6.10 x10(6)/mcL Final    Hgb 11/28/2023 13.1 (L)  14.0 - 18.0 g/dL Final    Hct 11/28/2023 35.0 (L)  42.0 - 52.0 % Final    MCV 11/28/2023 81.6  80.0 - 94.0 fL Final    MCH 11/28/2023 30.5  27.0 - 31.0 pg Final    MCHC 11/28/2023 37.4 (H)  33.0 - 36.0 g/dL Final    RDW 11/28/2023 16.2  11.5 - 17.0 % Final    Platelet 11/28/2023 304  130 - 400 x10(3)/mcL Final    MPV 11/28/2023 9.4  7.4 - 10.4 fL Final    Neut % 11/28/2023 66.8  % Final    Lymph % 11/28/2023 24.5  % Final    Mono % 11/28/2023 5.7  % Final    Eos % 11/28/2023 1.8  % Final    Basophil % 11/28/2023 0.9  % Final    Lymph # 11/28/2023 2.85  0.6 - 4.6 x10(3)/mcL Final    Neut # 11/28/2023 7.79  2.1 - 9.2 x10(3)/mcL Final    Mono # 11/28/2023 0.66  0.1 - 1.3 x10(3)/mcL Final     Eos # 11/28/2023 0.21  0 - 0.9 x10(3)/mcL Final    Baso # 11/28/2023 0.11  <=0.2 x10(3)/mcL Final    IG# 11/28/2023 0.03  0 - 0.04 x10(3)/mcL Final    IG% 11/28/2023 0.3  % Final    NRBC% 11/28/2023 0.3  % Final    RBC Morph 11/28/2023 Abnormal (A)  Normal Final    Anisocytosis 11/28/2023 1+ (A)  (none) Final    Elliptocytosis 11/28/2023 Slight (A)  (none) Final    Hypochromasia 11/28/2023 Slight (A)  (none) Final    Macrocytosis 11/28/2023 1+ (A)  (none) Final    Microcytosis 11/28/2023 1+ (A)  (none) Final    Ovalocytes 11/28/2023 Slight (A)  (none) Final    Poikilocytosis 11/28/2023 1+ (A)  (none) Final    Polychromasia 11/28/2023 Slight (A)  (none) Final    Stippled RBCs 11/28/2023 Slight (A)  (none) Final    Tear Drops 11/28/2023 1+ (A)  (none) Final    Platelets 11/28/2023 Adequate  Normal, Adequate Final       Imaging Results    None                        ED Course as of 11/28/23 2215 Tue Nov 28, 2023 2117 Hemoglobin(!): 13.1 [MP]   2117 Hematocrit(!): 35.0 [MP]   2131 Blood Smear Microscopic Exam(!)  No sickle cells seen in smear?? [MP]      ED Course User Index  [MP] Ronak Jacob DO                        Diagnostic Impression:    1. Chronic pain syndrome         ED Disposition Condition    Discharge Stable             Follow-up Information       [facility] - Emergency Dept.    Specialty: Emergency Medicine  Why: If symptoms worsen  Contact information:  [address]  [facility] [address] [phone]                            ED Prescriptions    None       Follow-up Information       Follow up With Specialties Details Why Contact Info    [facility] - Emergency Dept Emergency Medicine  If symptoms worsen [address]  [facility] [address] [phone]             Ronak Jacob, DO  11/28/23 2212

## 2023-11-29 NOTE — ED TRIAGE NOTES
Pt complaint of bilateral leg pain x 3 days relating that he has sickle cell disease under care of physician in [place] at [facility] and PCP Dr Valdez in [place]